# Patient Record
Sex: MALE | Race: BLACK OR AFRICAN AMERICAN | Employment: STUDENT | URBAN - METROPOLITAN AREA
[De-identification: names, ages, dates, MRNs, and addresses within clinical notes are randomized per-mention and may not be internally consistent; named-entity substitution may affect disease eponyms.]

---

## 2018-07-11 ENCOUNTER — HOSPITAL ENCOUNTER (EMERGENCY)
Age: 53
Discharge: HOME | End: 2018-07-11
Attending: EMERGENCY MEDICINE
Payer: OTHER GOVERNMENT

## 2018-07-11 ENCOUNTER — APPOINTMENT (OUTPATIENT)
Dept: GENERAL RADIOLOGY | Age: 53
End: 2018-07-11
Attending: NURSE PRACTITIONER
Payer: OTHER GOVERNMENT

## 2018-07-11 VITALS
TEMPERATURE: 98 F | DIASTOLIC BLOOD PRESSURE: 82 MMHG | OXYGEN SATURATION: 98 % | HEART RATE: 74 BPM | RESPIRATION RATE: 16 BRPM | SYSTOLIC BLOOD PRESSURE: 140 MMHG

## 2018-07-11 DIAGNOSIS — M25.562 ACUTE PAIN OF LEFT KNEE: ICD-10-CM

## 2018-07-11 DIAGNOSIS — S86.912A KNEE STRAIN, LEFT, INITIAL ENCOUNTER: Primary | ICD-10-CM

## 2018-07-11 PROCEDURE — 99283 EMERGENCY DEPT VISIT LOW MDM: CPT

## 2018-07-11 PROCEDURE — 73564 X-RAY EXAM KNEE 4 OR MORE: CPT

## 2018-07-11 PROCEDURE — 74011250637 HC RX REV CODE- 250/637: Performed by: NURSE PRACTITIONER

## 2018-07-11 RX ORDER — HYDROCODONE BITARTRATE AND ACETAMINOPHEN 5; 325 MG/1; MG/1
1 TABLET ORAL
Status: COMPLETED | OUTPATIENT
Start: 2018-07-11 | End: 2018-07-11

## 2018-07-11 RX ORDER — IBUPROFEN 400 MG/1
800 TABLET ORAL
Status: COMPLETED | OUTPATIENT
Start: 2018-07-11 | End: 2018-07-11

## 2018-07-11 RX ORDER — NAPROXEN SODIUM 550 MG/1
550 TABLET ORAL 2 TIMES DAILY WITH MEALS
Qty: 20 TAB | Refills: 0 | Status: SHIPPED | OUTPATIENT
Start: 2018-07-11

## 2018-07-11 RX ADMIN — IBUPROFEN 800 MG: 400 TABLET ORAL at 17:33

## 2018-07-11 RX ADMIN — HYDROCODONE BITARTRATE AND ACETAMINOPHEN 1 TABLET: 5; 325 TABLET ORAL at 17:33

## 2018-07-11 NOTE — ED NOTES
Updated patient on plan of care. Patient expressed understanding. Patient sitting in results waiting room.  NAD

## 2018-07-11 NOTE — LETTER
NOTIFICATION RETURN TO WORK / SCHOOL 
 
7/11/2018 5:39 PM 
 
Mr. Juwan Mae [address] [id number] To Whom It May Concern: 
 
Juwan Mae is currently under the care of [facility] EMERGENCY DEPT. He will return to work/school on: 7/14/2018 If there are questions or concerns please have the patient contact our office. Sincerely, Radha MCCLAINN, RN

## 2018-07-11 NOTE — ED PROVIDER NOTES
HPI Comments: Debbie Yanez is a 47year old male who presents to the ED with a c/o left knee pain for the past five weeks. States he has tried to mediate with OTC meds, but it hasn't helped. Nothing makes it better or worse. Denies trauma. Patient is a 47 y.o. male presenting with knee pain. The history is provided by the patient. History limited by: No communication barrier. Knee Pain    This is a new problem. The problem occurs constantly. The problem has not changed since onset. The pain is present in the left knee. The pain is mild. He has tried nothing for the symptoms. The treatment provided no relief. There has been no history of extremity trauma. Past Medical History:   Diagnosis Date    Anxiety     Constipation     Double vision     Insomnia        Past Surgical History:   Procedure Laterality Date    COLONOSCOPY  3/24/2015 Return 3/25/2025    Dr. Chai Augustin HX BUNIONECTOMY      HX ORTHOPAEDIC           Family History:   Problem Relation Age of Onset    Psychiatric Disorder Mother        Social History     Social History    Marital status: SINGLE     Spouse name: N/A    Number of children: N/A    Years of education: N/A     Occupational History    Not on file. Social History Main Topics    Smoking status: Current Some Day Smoker     Types: Cigarettes    Smokeless tobacco: Never Used    Alcohol use No    Drug use: Yes     Special: Cocaine      Comment: cocaine in the past    Sexual activity: Not Currently     Other Topics Concern    Not on file     Social History Narrative         ALLERGIES: Review of patient's allergies indicates no known allergies. Review of Systems   Constitutional: Negative. HENT: Negative. Eyes: Negative. Respiratory: Negative. Cardiovascular: Negative. Gastrointestinal: Negative. Endocrine: Negative. Genitourinary: Negative. Musculoskeletal: Positive for arthralgias (left knee). Skin: Negative. Allergic/Immunologic: Negative. Neurological: Negative. Hematological: Negative. Psychiatric/Behavioral: Negative. Vitals:    07/11/18 1357   BP: 148/75   Pulse: 74   Resp: 16   Temp: 98 °F (36.7 °C)   SpO2: 98%            Physical Exam   Constitutional: He is oriented to person, place, and time. He appears well-developed and well-nourished. No distress. HENT:   Head: Normocephalic and atraumatic. Eyes: Pupils are equal, round, and reactive to light. Neck: Normal range of motion. Neck supple. Cardiovascular: Normal rate and regular rhythm. Pulmonary/Chest: Effort normal and breath sounds normal.   Abdominal: Soft. Bowel sounds are normal.   Genitourinary:   Genitourinary Comments: NE   Musculoskeletal: Normal range of motion. He exhibits tenderness. He exhibits no edema or deformity. Full ROM in flexion and extension of the left knee. Anterior / Posterior Drawers - neg  Varus / Valgus - neg  Reno's - neg  Distal neurovascular remains intact. Neurological: He is alert and oriented to person, place, and time. Skin: Skin is warm and dry. Psychiatric: He has a normal mood and affect. Nursing note and vitals reviewed. MDM  Number of Diagnoses or Management Options  Acute pain of left knee:   Knee strain, left, initial encounter:   Diagnosis management comments: Progress note:  left KNEE xr REVIEWED. nO ACUTE FINDINGS. Nishant Godinez NP  4:34 PM    MDM:  Will refer to PCP for follow up   Nishant Godinez NP  4:35 PM             Amount and/or Complexity of Data Reviewed  Tests in the radiology section of CPT®: ordered and reviewed  Independent visualization of images, tracings, or specimens: yes (Plain film XR  )    Risk of Complications, Morbidity, and/or Mortality  Presenting problems: low  Diagnostic procedures: low  Management options: low          ED Course       Procedures    Diagnosis:   1. Knee strain, left, initial encounter    2.  Acute pain of left knee Disposition:   discharged to Home. Follow-up Information     Follow up With Details Comments Contact Cyndy Jordan Call to arrange follow up     [place]  [phone]          Patient's Medications   Start Taking    NAPROXEN SODIUM (ANAPROX DS) 550 MG TABLET    Take 1 Tab by mouth two (2) times daily (with meals). Continue Taking    DIVALPROEX ER (DEPAKOTE ER) 500 MG ER TABLET    Take  by mouth. HYDROCODONE-ACETAMINOPHEN (NORCO) 5-325 MG PER TABLET    Take  by mouth. QUETIAPINE (SEROQUEL) 100 MG TABLET    Take 50 mg by mouth two (2) times a day. TRIMETHOPRIM-SULFAMETHOXAZOLE (BACTRIM DS) 160-800 MG PER TABLET    Take 1 Tab by mouth two (2) times a day.    These Medications have changed    No medications on file   Stop Taking    No medications on file

## 2018-07-11 NOTE — DISCHARGE INSTRUCTIONS
[employer] Activation    Thank you for requesting access to [employer]. Please follow the instructions below to securely access and download your online medical record. [employer] allows you to send messages to your doctor, view your test results, renew your prescriptions, schedule appointments, and more. How Do I Sign Up? 1. In your internet browser, go to [email or web address]  2. Click on the First Time User? Click Here link in the Sign In box. You will be redirect to the New Member Sign Up page. 3. Enter your [employer] Access Code exactly as it appears below. You will not need to use this code after youve completed the sign-up process. If you do not sign up before the expiration date, you must request a new code. [employer] Access Code: [email or web address]  Expires: 10/9/2018  1:58 PM (This is the date your [employer] access code will )    4. Enter the last four digits of your Social Security Number (xxxx) and Date of Birth (mm/dd/yyyy) as indicated and click Submit. You will be taken to the next sign-up page. 5. Create a [employer] ID. This will be your [employer] login ID and cannot be changed, so think of one that is secure and easy to remember. 6. Create a [employer] password. You can change your password at any time. 7. Enter your Password Reset Question and Answer. This can be used at a later time if you forget your password. 8. Enter your e-mail address. You will receive e-mail notification when new information is available in [address]. 9. Click Sign Up. You can now view and download portions of your medical record. 10. Click the Download Summary menu link to download a portable copy of your medical information. Additional Information    If you have questions, please visit the Frequently Asked Questions section of the [employer] website at [email or web address] [email or web address]. [email or web address] Remember, [employer] is NOT to be used for urgent needs. For medical emergencies, dial 911.     Follow up with the primary care referral for further evaluation and treatment.

## 2018-07-11 NOTE — ED NOTES
Patient discharged to home. Reviewed discharge information and prescription with patient. All questions answered.  Patient ambulated independently out of care area

## 2023-02-28 ENCOUNTER — NURSE ONLY (OUTPATIENT)
Age: 58
End: 2023-02-28

## 2023-02-28 VITALS
WEIGHT: 202 LBS | OXYGEN SATURATION: 97 % | BODY MASS INDEX: 25.93 KG/M2 | HEIGHT: 74 IN | TEMPERATURE: 97.6 F | RESPIRATION RATE: 17 BRPM | HEART RATE: 74 BPM

## 2023-02-28 DIAGNOSIS — Z12.11 COLON CANCER SCREENING: Primary | ICD-10-CM

## 2023-02-28 NOTE — PROGRESS NOTES
Colon Screen    Patient: Neha Araya MRN: [id number]  SSN: [id number]    YOB: 1965  Age: 65 y.o. Sex: male        Subjective:   Neha Araya was referred by his [address], APRN - NP. Patient referred for colonoscopy for   Screening colonoscopy. Patient denies rectal pain or bleeding. Abdominal surgeries as described below, specifically none. Family history as described below, specifically none. Last colonoscopy was 8 years ago. Allergies   Allergen Reactions    Grass Pollen(K-O-R-T-Swt Wesly)        Past Medical History:   Diagnosis Date    Anxiety     Bipolar 1 disorder ([facility] [address]      Past Surgical History:   Procedure Laterality Date    BUNIONECTOMY      COLONOSCOPY      KNEE ARTHROSCOPY Left     meniscetomy    ORTHOPEDIC SURGERY Right     right radius      No family history on file.   Social History     Tobacco Use    Smoking status: Some Days     Types: Cigarettes    Smokeless tobacco: Never   Substance Use Topics    Alcohol use: Not Currently      Prior to Admission medications    Not on File          Risks colonoscopy described- colon injury, missed lesion, anesthesia problems, bleeding       Annette Krabbe, LPN  February 28, 5533  11:51 AM

## 2023-04-20 ENCOUNTER — HOSPITAL ENCOUNTER (OUTPATIENT)
Age: 58
End: 2023-04-20
Payer: OTHER GOVERNMENT

## 2023-04-20 ENCOUNTER — OFFICE VISIT (OUTPATIENT)
Age: 58
End: 2023-04-20
Payer: OTHER GOVERNMENT

## 2023-04-20 ENCOUNTER — HOSPITAL ENCOUNTER (OUTPATIENT)
Age: 58
Discharge: HOME | End: 2023-04-20
Payer: OTHER GOVERNMENT

## 2023-04-20 DIAGNOSIS — S83.221A PERIPHERAL TEAR OF MEDIAL MENISCUS OF RIGHT KNEE AS CURRENT INJURY, INITIAL ENCOUNTER: ICD-10-CM

## 2023-04-20 DIAGNOSIS — M25.561 ACUTE PAIN OF RIGHT KNEE: Primary | ICD-10-CM

## 2023-04-20 LAB
ANION GAP SERPL CALC-SCNC: 6 MMOL/L (ref 3–18)
BUN SERPL-MCNC: 19 MG/DL (ref 7–18)
BUN/CREAT SERPL: 13 (ref 12–20)
CA-I BLD-MCNC: 9.2 MG/DL (ref 8.5–10.1)
CHLORIDE SERPL-SCNC: 108 MMOL/L (ref 100–111)
CO2 SERPL-SCNC: 27 MMOL/L (ref 21–32)
CREAT SERPL-MCNC: 1.45 MG/DL (ref 0.6–1.3)
EKG ATRIAL RATE: 77 BPM
EKG DIAGNOSIS: NORMAL
EKG P AXIS: 46 DEGREES
EKG P-R INTERVAL: 138 MS
EKG Q-T INTERVAL: 388 MS
EKG QRS DURATION: 90 MS
EKG QTC CALCULATION (BAZETT): 439 MS
EKG R AXIS: -18 DEGREES
EKG T AXIS: 39 DEGREES
EKG VENTRICULAR RATE: 77 BPM
ERYTHROCYTE [DISTWIDTH] IN BLOOD BY AUTOMATED COUNT: 13.2 % (ref 11.6–14.5)
GLUCOSE SERPL-MCNC: 108 MG/DL (ref 74–99)
HCT VFR BLD AUTO: 43.6 % (ref 36–48)
HGB BLD-MCNC: 15 G/DL (ref 13–16)
MCH RBC QN AUTO: 30.9 PG (ref 24–34)
MCHC RBC AUTO-ENTMCNC: 34.4 G/DL (ref 31–37)
MCV RBC AUTO: 89.9 FL (ref 78–100)
NRBC # BLD: 0 K/UL (ref 0–0.01)
NRBC BLD-RTO: 0 PER 100 WBC
PLATELET # BLD AUTO: 259 K/UL (ref 135–420)
PMV BLD AUTO: 9.6 FL (ref 9.2–11.8)
POTASSIUM SERPL-SCNC: 4.5 MMOL/L (ref 3.5–5.5)
RBC # BLD AUTO: 4.85 M/UL (ref 4.35–5.65)
SODIUM SERPL-SCNC: 141 MMOL/L (ref 136–145)
WBC # BLD AUTO: 10.5 K/UL (ref 4.6–13.2)

## 2023-04-20 PROCEDURE — 85027 COMPLETE CBC AUTOMATED: CPT

## 2023-04-20 PROCEDURE — 93005 ELECTROCARDIOGRAM TRACING: CPT

## 2023-04-20 PROCEDURE — 80048 BASIC METABOLIC PNL TOTAL CA: CPT

## 2023-04-20 PROCEDURE — 99214 OFFICE O/P EST MOD 30 MIN: CPT | Performed by: ORTHOPAEDIC SURGERY

## 2023-04-20 PROCEDURE — 36415 COLL VENOUS BLD VENIPUNCTURE: CPT

## 2023-04-20 RX ORDER — DOXYCYCLINE HYCLATE 50 MG/1
CAPSULE, GELATIN COATED ORAL
COMMUNITY
Start: 2023-04-11

## 2023-04-20 RX ORDER — MELOXICAM 15 MG/1
15 TABLET ORAL
COMMUNITY
Start: 2022-05-02 | End: 2023-04-20

## 2023-04-20 RX ORDER — CHLORAL HYDRATE 500 MG
CAPSULE ORAL
COMMUNITY
Start: 2023-04-11

## 2023-04-20 RX ORDER — HYDROXYZINE 50 MG/1
TABLET, FILM COATED ORAL
Status: ON HOLD | COMMUNITY
Start: 2023-04-10 | End: 2023-04-24

## 2023-04-20 RX ORDER — HYDROXYZINE PAMOATE 25 MG/1
25 CAPSULE ORAL
COMMUNITY
Start: 2023-03-29

## 2023-04-20 RX ORDER — ONDANSETRON 8 MG/1
8 TABLET, ORALLY DISINTEGRATING ORAL EVERY 8 HOURS PRN
Qty: 30 TABLET | Refills: 0 | Status: SHIPPED | OUTPATIENT
Start: 2023-04-20

## 2023-04-20 RX ORDER — NALOXONE HYDROCHLORIDE 4 MG/.1ML
SPRAY NASAL
COMMUNITY
Start: 2022-08-01 | End: 2023-04-20

## 2023-04-20 RX ORDER — THIAMINE HCL 50 MG
TABLET ORAL
COMMUNITY
Start: 2023-04-11

## 2023-04-20 RX ORDER — LIDOCAINE 50 MG/G
OINTMENT TOPICAL
COMMUNITY
Start: 2022-06-09 | End: 2023-04-20

## 2023-04-20 RX ORDER — OXYCODONE HYDROCHLORIDE AND ACETAMINOPHEN 5; 325 MG/1; MG/1
1 TABLET ORAL
Qty: 30 TABLET | Refills: 0 | Status: SHIPPED | OUTPATIENT
Start: 2023-04-20 | End: 2023-04-28

## 2023-04-20 RX ORDER — B-COMPLEX WITH VITAMIN C
1 TABLET ORAL DAILY
COMMUNITY

## 2023-04-21 ENCOUNTER — ANESTHESIA EVENT (OUTPATIENT)
Age: 58
End: 2023-04-21
Payer: OTHER GOVERNMENT

## 2023-04-21 RX ORDER — SODIUM CHLORIDE 9 MG/ML
INJECTION, SOLUTION INTRAVENOUS PRN
Status: CANCELLED | OUTPATIENT
Start: 2023-04-21

## 2023-04-24 ENCOUNTER — ANESTHESIA (OUTPATIENT)
Age: 58
End: 2023-04-24
Payer: OTHER GOVERNMENT

## 2023-04-24 ENCOUNTER — HOSPITAL ENCOUNTER (OUTPATIENT)
Age: 58
Setting detail: OUTPATIENT SURGERY
Discharge: HOME | End: 2023-04-24
Attending: ORTHOPAEDIC SURGERY | Admitting: ORTHOPAEDIC SURGERY
Payer: OTHER GOVERNMENT

## 2023-04-24 VITALS
OXYGEN SATURATION: 100 % | BODY MASS INDEX: 28.37 KG/M2 | DIASTOLIC BLOOD PRESSURE: 93 MMHG | SYSTOLIC BLOOD PRESSURE: 154 MMHG | WEIGHT: 221 LBS | HEART RATE: 64 BPM | RESPIRATION RATE: 18 BRPM | TEMPERATURE: 97 F

## 2023-04-24 PROCEDURE — 2580000003 HC RX 258: Performed by: NURSE PRACTITIONER

## 2023-04-24 PROCEDURE — 2500000003 HC RX 250 WO HCPCS: Performed by: ORTHOPAEDIC SURGERY

## 2023-04-24 PROCEDURE — 2500000003 HC RX 250 WO HCPCS: Performed by: NURSE ANESTHETIST, CERTIFIED REGISTERED

## 2023-04-24 PROCEDURE — 6360000002 HC RX W HCPCS: Performed by: NURSE PRACTITIONER

## 2023-04-24 PROCEDURE — 3700000001 HC ADD 15 MINUTES (ANESTHESIA): Performed by: ORTHOPAEDIC SURGERY

## 2023-04-24 PROCEDURE — 3600000012 HC SURGERY LEVEL 2 ADDTL 15MIN: Performed by: ORTHOPAEDIC SURGERY

## 2023-04-24 PROCEDURE — 7100000001 HC PACU RECOVERY - ADDTL 15 MIN: Performed by: ORTHOPAEDIC SURGERY

## 2023-04-24 PROCEDURE — 6360000002 HC RX W HCPCS: Performed by: NURSE ANESTHETIST, CERTIFIED REGISTERED

## 2023-04-24 PROCEDURE — 6370000000 HC RX 637 (ALT 250 FOR IP): Performed by: NURSE PRACTITIONER

## 2023-04-24 PROCEDURE — 3600000002 HC SURGERY LEVEL 2 BASE: Performed by: ORTHOPAEDIC SURGERY

## 2023-04-24 PROCEDURE — 7100000010 HC PHASE II RECOVERY - FIRST 15 MIN: Performed by: ORTHOPAEDIC SURGERY

## 2023-04-24 PROCEDURE — 3700000000 HC ANESTHESIA ATTENDED CARE: Performed by: ORTHOPAEDIC SURGERY

## 2023-04-24 PROCEDURE — 7100000011 HC PHASE II RECOVERY - ADDTL 15 MIN: Performed by: ORTHOPAEDIC SURGERY

## 2023-04-24 PROCEDURE — 2709999900 HC NON-CHARGEABLE SUPPLY: Performed by: ORTHOPAEDIC SURGERY

## 2023-04-24 PROCEDURE — 7100000000 HC PACU RECOVERY - FIRST 15 MIN: Performed by: ORTHOPAEDIC SURGERY

## 2023-04-24 RX ORDER — ONDANSETRON 2 MG/ML
INJECTION INTRAMUSCULAR; INTRAVENOUS PRN
Status: DISCONTINUED | OUTPATIENT
Start: 2023-04-24 | End: 2023-04-24 | Stop reason: SDUPTHER

## 2023-04-24 RX ORDER — QUETIAPINE 150 MG/1
150 TABLET, FILM COATED, EXTENDED RELEASE ORAL DAILY
COMMUNITY

## 2023-04-24 RX ORDER — ALBUTEROL SULFATE 2.5 MG/3ML
2.5 SOLUTION RESPIRATORY (INHALATION)
Status: DISCONTINUED | OUTPATIENT
Start: 2023-04-24 | End: 2023-04-24 | Stop reason: HOSPADM

## 2023-04-24 RX ORDER — MIDAZOLAM HYDROCHLORIDE 1 MG/ML
2 INJECTION INTRAMUSCULAR; INTRAVENOUS
Status: DISCONTINUED | OUTPATIENT
Start: 2023-04-24 | End: 2023-04-24 | Stop reason: HOSPADM

## 2023-04-24 RX ORDER — FENTANYL CITRATE 50 UG/ML
50 INJECTION, SOLUTION INTRAMUSCULAR; INTRAVENOUS EVERY 5 MIN PRN
Status: DISCONTINUED | OUTPATIENT
Start: 2023-04-24 | End: 2023-04-24 | Stop reason: HOSPADM

## 2023-04-24 RX ORDER — DIPHENHYDRAMINE HYDROCHLORIDE 50 MG/ML
12.5 INJECTION INTRAMUSCULAR; INTRAVENOUS
Status: DISCONTINUED | OUTPATIENT
Start: 2023-04-24 | End: 2023-04-24 | Stop reason: HOSPADM

## 2023-04-24 RX ORDER — SODIUM CHLORIDE 0.9 % (FLUSH) 0.9 %
5-40 SYRINGE (ML) INJECTION PRN
Status: DISCONTINUED | OUTPATIENT
Start: 2023-04-24 | End: 2023-04-24 | Stop reason: HOSPADM

## 2023-04-24 RX ORDER — SODIUM CHLORIDE 0.9 % (FLUSH) 0.9 %
5-40 SYRINGE (ML) INJECTION EVERY 12 HOURS SCHEDULED
Status: DISCONTINUED | OUTPATIENT
Start: 2023-04-24 | End: 2023-04-24 | Stop reason: HOSPADM

## 2023-04-24 RX ORDER — OXYCODONE HYDROCHLORIDE AND ACETAMINOPHEN 5; 325 MG/1; MG/1
1 TABLET ORAL EVERY 4 HOURS PRN
Status: CANCELLED | OUTPATIENT
Start: 2023-04-24

## 2023-04-24 RX ORDER — LIDOCAINE HYDROCHLORIDE 20 MG/ML
INJECTION, SOLUTION EPIDURAL; INFILTRATION; INTRACAUDAL; PERINEURAL PRN
Status: DISCONTINUED | OUTPATIENT
Start: 2023-04-24 | End: 2023-04-24 | Stop reason: SDUPTHER

## 2023-04-24 RX ORDER — SODIUM CHLORIDE, SODIUM LACTATE, POTASSIUM CHLORIDE, CALCIUM CHLORIDE 600; 310; 30; 20 MG/100ML; MG/100ML; MG/100ML; MG/100ML
INJECTION, SOLUTION INTRAVENOUS CONTINUOUS
Status: DISCONTINUED | OUTPATIENT
Start: 2023-04-24 | End: 2023-04-24 | Stop reason: HOSPADM

## 2023-04-24 RX ORDER — PROCHLORPERAZINE EDISYLATE 5 MG/ML
5 INJECTION INTRAMUSCULAR; INTRAVENOUS
Status: DISCONTINUED | OUTPATIENT
Start: 2023-04-24 | End: 2023-04-24 | Stop reason: HOSPADM

## 2023-04-24 RX ORDER — CELECOXIB 200 MG/1
200 CAPSULE ORAL ONCE
Status: COMPLETED | OUTPATIENT
Start: 2023-04-24 | End: 2023-04-24

## 2023-04-24 RX ORDER — LABETALOL HYDROCHLORIDE 5 MG/ML
10 INJECTION, SOLUTION INTRAVENOUS
Status: DISCONTINUED | OUTPATIENT
Start: 2023-04-24 | End: 2023-04-24 | Stop reason: HOSPADM

## 2023-04-24 RX ORDER — GLYCOPYRROLATE 0.2 MG/ML
INJECTION INTRAMUSCULAR; INTRAVENOUS PRN
Status: DISCONTINUED | OUTPATIENT
Start: 2023-04-24 | End: 2023-04-24 | Stop reason: SDUPTHER

## 2023-04-24 RX ORDER — BUPIVACAINE HYDROCHLORIDE AND EPINEPHRINE 2.5; 5 MG/ML; UG/ML
INJECTION, SOLUTION INFILTRATION; PERINEURAL PRN
Status: DISCONTINUED | OUTPATIENT
Start: 2023-04-24 | End: 2023-04-24 | Stop reason: HOSPADM

## 2023-04-24 RX ORDER — HALOPERIDOL 5 MG/ML
1 INJECTION INTRAMUSCULAR
Status: DISCONTINUED | OUTPATIENT
Start: 2023-04-24 | End: 2023-04-24 | Stop reason: HOSPADM

## 2023-04-24 RX ORDER — DEXAMETHASONE SODIUM PHOSPHATE 10 MG/ML
INJECTION INTRAMUSCULAR; INTRAVENOUS PRN
Status: DISCONTINUED | OUTPATIENT
Start: 2023-04-24 | End: 2023-04-24 | Stop reason: SDUPTHER

## 2023-04-24 RX ORDER — MIDAZOLAM HYDROCHLORIDE 1 MG/ML
INJECTION INTRAMUSCULAR; INTRAVENOUS PRN
Status: DISCONTINUED | OUTPATIENT
Start: 2023-04-24 | End: 2023-04-24 | Stop reason: SDUPTHER

## 2023-04-24 RX ORDER — PROPOFOL 10 MG/ML
INJECTION, EMULSION INTRAVENOUS PRN
Status: DISCONTINUED | OUTPATIENT
Start: 2023-04-24 | End: 2023-04-24 | Stop reason: SDUPTHER

## 2023-04-24 RX ORDER — LAMOTRIGINE 25 MG/1
25 TABLET ORAL DAILY
COMMUNITY

## 2023-04-24 RX ORDER — OXYCODONE AND ACETAMINOPHEN 10; 325 MG/1; MG/1
1 TABLET ORAL EVERY 6 HOURS PRN
Status: CANCELLED | OUTPATIENT
Start: 2023-04-24

## 2023-04-24 RX ADMIN — GLYCOPYRROLATE 0.2 MG: 0.2 INJECTION INTRAMUSCULAR; INTRAVENOUS at 07:15

## 2023-04-24 RX ADMIN — CEFAZOLIN 2000 MG: 2 INJECTION, POWDER, FOR SOLUTION INTRAMUSCULAR; INTRAVENOUS at 07:38

## 2023-04-24 RX ADMIN — LIDOCAINE HYDROCHLORIDE 100 MG: 20 INJECTION, SOLUTION EPIDURAL; INFILTRATION; INTRACAUDAL; PERINEURAL at 07:35

## 2023-04-24 RX ADMIN — SODIUM CHLORIDE, POTASSIUM CHLORIDE, SODIUM LACTATE AND CALCIUM CHLORIDE: 600; 310; 30; 20 INJECTION, SOLUTION INTRAVENOUS at 07:08

## 2023-04-24 RX ADMIN — HYDROMORPHONE HYDROCHLORIDE 1 MG: 1 INJECTION, SOLUTION INTRAMUSCULAR; INTRAVENOUS; SUBCUTANEOUS at 07:29

## 2023-04-24 RX ADMIN — CELECOXIB 200 MG: 200 CAPSULE ORAL at 07:15

## 2023-04-24 RX ADMIN — MIDAZOLAM HYDROCHLORIDE 2 MG: 2 INJECTION, SOLUTION INTRAMUSCULAR; INTRAVENOUS at 07:29

## 2023-04-24 RX ADMIN — DEXAMETHASONE SODIUM PHOSPHATE 10 MG: 10 INJECTION INTRAMUSCULAR; INTRAVENOUS at 07:39

## 2023-04-24 RX ADMIN — PROPOFOL 200 MG: 10 INJECTION, EMULSION INTRAVENOUS at 07:35

## 2023-04-24 RX ADMIN — ONDANSETRON 4 MG: 2 INJECTION INTRAMUSCULAR; INTRAVENOUS at 07:54

## 2023-04-24 ASSESSMENT — PAIN SCALES - GENERAL
PAINLEVEL_OUTOF10: 0

## 2023-04-24 ASSESSMENT — PAIN - FUNCTIONAL ASSESSMENT: PAIN_FUNCTIONAL_ASSESSMENT: NONE - DENIES PAIN

## 2023-04-24 NOTE — OP NOTE
Operative Note    Patient: Gretel Moreno MRN: [id number]  Surgery Date: [unfilled]  [unfilled]          Procedure  Primary Surgeon    RIGHT KNEE ARTHROSCOPY/ MEDIAL MENISECTOMY/SYNOVECTOMY  Daniela Gomez MD    * Panel 2 does not exist *  * Panel 2 does not exist *    * Panel 3 does not exist *  * Panel 3 does not exist *     Surgeon(s) and Role:     * Daniela Gomez MD - Primary    Other OR Staff/Assistants:  Circulator: Desirae Verde RN  Scrub Person First: Julio Caceres    1st Assistant Tasks:  Closing    Pre-operative Diagnosis:  [unfilled]    Post-operative Diagnosis: same as preop diagnosis    Anesthesia Type: General     Findings: Medial meniscus tear. Synovitis. Complications: No    EBL: 10 CC    Specimens: None    Operative procedure: Meniscectomy and major synovectomy      Operative knee was prepped and draped in a sterile fashion after adequate anesthesia was given. Lateral portal and anteromedial portal were made along with outflow portal.  Patellofemoral joint was visualized patient was found to have a significant amount of synovitis of all 3 compartments at the at that point major synovectomy of all 3 compartments was performed. Medial compartment there is a posterior medial meniscus tear at that point a shaver and up biter were used to do a partial medial meniscectomy. ACL was intact     Lateral compartment there was a lateral synovitis at that point synovectomy of the lateral compartment is performed. There is no evidence of any meniscus tear or chondral injury. Copious irrigation was performed port portals were closed with Steri-Strips compressive dressing was applied patient was taken to PACU in stable condition after extubation.     Daniela Gomez MD

## 2023-04-24 NOTE — DISCHARGE INSTRUCTIONS
Lower Extremity Post-op Instructions: Your first meal home should be clear liquids    If you are given antibiotics, start antibiotics evening of the surgery unless otherwise instructed. Start Pain meds, the night you have surgery if you need is. No alcohol while on pain meds postop. An Ice bag should be applied to your operative site for at least 20 minutes four times a day. DO NOT USE HEAT-this may cause increased swelling and discomfort. You may move your toes as tolerated. You may bear weight as tolerated unless physical therapy has instructed you otherwise with the weightbearing status. Dressing should remain in place for 5 days. If you have a brace on or a splint on than those dressings and splint needs to remain in place until the follow-up appointment. No driving if you have a splint or a brace on. Swelling and discoloration may be present post-operatively. This is normal.    If your job requires little physical activity you may return as you feel able. If your job requires excessive lifting or use of affected extremity, please discuss return to work date with your nurse or physician. If you are given a virtual appointment please make sure you have a smart phone with the camera. If you need refill of pain medication, call the office 2 business days prior to running out of medication. Please call during regular business hours, Medication refill requests will not be addressed during non-business hours. Please do not page the on-call provider for pain medication refills after hours. If you have had an arthroscopic procedure you will be provided with with pictures. Please bring those pictures at the follow-up appointment. If you have a virtual appointment please have the pictures readily available during your virtual appointment.               Things to watch for:             Increased swelling of the surgical site             Spreading of redness around the incision site

## 2023-04-24 NOTE — INTERVAL H&P NOTE
Update History & Physical    The Patient's History and Physical was reviewed with the patient. The patient was examined. There was no change. The surgical site was confirmed by the patient and me. Patient understands and wants to proceed with the procedure. If applicable, I have discussed with the patient / power of  the rationale for blood component transfusion; its benefits in treating or preventing fatigue, organ damage, or death; and its risk which includes mild transfusion reactions, rare risk of blood borne infection, or more serious but rare reactions. I have discussed the alternatives to transfusion, including the risk and consequences of not receiving transfusion. The patient / Ashwini Heady of  had an opportunity to ask questions and had agreed to proceed with transfusion of blood components. Plan:  The risk, benefits, expected outcome, and alternative to the recommended procedure have been discussed with the patient.

## 2023-04-24 NOTE — PROGRESS NOTES
conducted a pre-surgery visit with Carina Estraad, who is a 65 y.o.,male. The  provided the following Interventions:  Initiated a relationship of care and support. Plan:  Chaplains will continue to follow and will provide pastoral care on an as needed/requested basis.  recommends bedside caregivers page  on duty if patient shows signs of acute spiritual or emotional distress.     [address] [place] [address]  [phone]

## 2023-04-24 NOTE — ANESTHESIA PRE PROCEDURE
Department of Anesthesiology  Preprocedure Note       Name:  Dee Raymundo   Age:  65 y.o.  :  1965                                          MRN:  [id number]         Date:  2023      Surgeon: Carleen Levy):  Girish Wyatt MD    Procedure: Procedure(s):  RIGHT KNEE ARTHROSCOPY/ MENISECTOMY    Medications prior to admission:   Prior to Admission medications    Medication Sig Start Date End Date Taking? Authorizing Provider   B Complex Vitamins (VITAMIN B COMPLEX) TABS Take 1 tablet by mouth daily   Yes Historical Provider, MD   vitamin D (CHOLECALCIFEROL) 95621 UNIT CAPS 1,250 mcg 22   Historical Provider, MD   hydrOXYzine HCl (ATARAX) 50 MG tablet  4/10/23   Historical Provider, MD   ferrous gluconate (FERGON) 324 (38 Fe) MG tablet  23   Historical Provider, MD   diclofenac sodium (VOLTAREN) 1 % GEL APPLY FOUR GRAMS TO INTACT SKIN OF PAINFUL AFFECTED AREA FOUR TIMES A DAY AS NEEDED FOR JOINT PAIN *PLEASE USE DICLOFENAC DOSING CARD FOR ADMINISTRATION* 22   Historical Provider, MD   hydrOXYzine pamoate (VISTARIL) 25 MG capsule 1 capsule 3/29/23   Historical Provider, MD   [place]-3 1000 MG CAPS  23   Historical Provider, MD   Thiamine HCl (VITAMIN B-1) 50 MG tablet  23   Historical Provider, MD   oxyCODONE-acetaminophen (PERCOCET) 5-325 MG per tablet Take 1 tablet by mouth every 4-6 hours as needed for Pain for up to 8 days. Do not start taking pain medication until after surgery! Max Daily Amount: 6 tablets 23  LAY Spears   ondansetron (ZOFRAN-ODT) 8 MG TBDP disintegrating tablet Place 1 tablet under the tongue every 8 hours as needed for Nausea or Vomiting 23   LAY Spears       Current medications:    No current facility-administered medications for this encounter. Allergies:     Allergies   Allergen Reactions    Grass Extracts [Gramineae Pollens]      Other reaction(s): unknown    Grass Pollen(K-O-R-T-Swt Wesly)        Problem List:  There is no

## 2023-04-24 NOTE — ANESTHESIA POSTPROCEDURE EVALUATION
Department of Anesthesiology  Postprocedure Note    Patient: Moose Eddy  MRN: [id number]  YOB: 1965  Date of evaluation: 4/24/2023      Procedure Summary     Date: 04/24/23 Room / Location: [facility] MAIN 03 / [facility] MAIN OR    Anesthesia Start: 0729 Anesthesia Stop: 0810    Procedure: RIGHT KNEE ARTHROSCOPY/ MEDIAL MENISECTOMY/SYNOVECTOMY (Right: Knee) Diagnosis:       Traumatic arthropathy of right knee      (Right knee pain, unspecified chronicity [M25.561])    Surgeons: Syed Mulligan MD Responsible Provider: Woody Lefort, APRN - CRNA    Anesthesia Type: General ASA Status: 2          Anesthesia Type: General    Margo Phase I:      Margo Phase II:        Anesthesia Post Evaluation    Patient location during evaluation: PACU  Patient participation: complete - patient participated  Level of consciousness: awake  Airway patency: patent  Nausea & Vomiting: no nausea and no vomiting  Complications: no  Cardiovascular status: hemodynamically stable and blood pressure returned to baseline  Respiratory status: acceptable and nasal cannula  Hydration status: stable  Comments: Ok to discharge once criteria met  Multimodal analgesia pain management approach

## 2023-05-08 ENCOUNTER — TELEMEDICINE (OUTPATIENT)
Age: 58
End: 2023-05-08

## 2023-05-08 DIAGNOSIS — S83.281D ACUTE LATERAL MENISCUS TEAR OF RIGHT KNEE, SUBSEQUENT ENCOUNTER: ICD-10-CM

## 2023-05-08 DIAGNOSIS — Z98.890 S/P ARTHROSCOPIC KNEE SURGERY: Primary | ICD-10-CM

## 2023-05-08 PROCEDURE — 99024 POSTOP FOLLOW-UP VISIT: CPT | Performed by: NURSE PRACTITIONER

## 2023-05-08 NOTE — PROGRESS NOTES
Subjective:      Patient presents for postop care following right knee arthroscopy with partial medial meniscectomy. Surgery was on 4/24/2023. Pain is controlled with current analgesics. Medication(s) being used: naproxen (OTC). .    Objective: There were no vitals taken for this visit. General:  alert, appears stated age, and cooperative   ROM: 0/120   Incisions:   healing well, dry & intact, mild swelling     Assessment:     Doing well postoperatively. Plan:     PT referral.  Pt is to increase activities as tolerated. Follow up joey. Lore Davis

## 2023-05-26 ENCOUNTER — HOSPITAL ENCOUNTER (OUTPATIENT)
Facility: HOSPITAL | Age: 58
Setting detail: RECURRING SERIES
Discharge: HOME | End: 2023-05-29
Payer: OTHER GOVERNMENT

## 2023-05-26 PROCEDURE — 97535 SELF CARE MNGMENT TRAINING: CPT

## 2023-05-26 PROCEDURE — 97162 PT EVAL MOD COMPLEX 30 MIN: CPT

## 2023-05-26 PROCEDURE — 97016 VASOPNEUMATIC DEVICE THERAPY: CPT

## 2023-05-31 ENCOUNTER — HOSPITAL ENCOUNTER (OUTPATIENT)
Facility: HOSPITAL | Age: 58
Setting detail: RECURRING SERIES
Discharge: HOME | End: 2023-06-03
Payer: OTHER GOVERNMENT

## 2023-05-31 PROCEDURE — 97016 VASOPNEUMATIC DEVICE THERAPY: CPT

## 2023-05-31 PROCEDURE — 97112 NEUROMUSCULAR REEDUCATION: CPT

## 2023-05-31 PROCEDURE — 97110 THERAPEUTIC EXERCISES: CPT

## 2023-05-31 NOTE — PROGRESS NOTES
PHYSICAL / OCCUPATIONAL THERAPY - DAILY TREATMENT NOTE (updated )    Patient Name: Frederick Runner    Date: 2023    : 1965  Insurance: Payor: Kyung  / Plan: Kyung  / Product Type: *No Product type* /      Patient  verified Yes     Visit #   Current / Total 2 15   Time   In / Out 12:34 1:35   Pain   In / Out 3 0   Subjective Functional Status/Changes: \"I was good about resting this weekend, so the pain is better. \"   Changes to:  Meds, Allergies, Med Hx, Sx Hx? If yes, update Summary List yes       TREATMENT AREA =  Pain in right knee [M25.561]  Other specified postprocedural states [Z98.890]  Other tear of lateral meniscus, current injury, right knee, subsequent encounter [E35.306G]    OBJECTIVE    Modalities Rationale:     decrease edema, decrease inflammation, and decrease pain to improve patient's ability to progress to PLOF and address remaining functional goals. min [] Estim Unattended, type/location:                                      []  w/ice    []  w/heat    min [] Estim Attended, type/location:                                     []  w/US     []  w/ice    []  w/heat    []  TENS insruct      min []  Mechanical Traction: type/lbs                   []  pro   []  sup   []  int   []  cont    []  before manual    []  after manual    min []  Ultrasound, settings/location:      min []  Iontophoresis w/ dexamethasone, location:                                               []  take home patch       []  in clinic    min  unbill []  Ice     []  Heat    location/position:     min []  Paraffin,  details:    10 min [x]  Vasopneumatic Device, press/temp: Low 34 deg; right knee    min []  Raji Cool / Tessy Ode:     If using vaso (only need to measure limb vaso being performed on)      pre-treatment girth : 40.5 cm      post-treatment girth : 40.2 cm      measured at (landmark location) :  mid patella    min []  Other:    Skin assessment post-treatment (if applicable):    [x]  intact

## 2023-06-05 ENCOUNTER — HOSPITAL ENCOUNTER (OUTPATIENT)
Facility: HOSPITAL | Age: 58
Setting detail: RECURRING SERIES
Discharge: HOME | End: 2023-06-08
Payer: OTHER GOVERNMENT

## 2023-06-05 PROCEDURE — 97110 THERAPEUTIC EXERCISES: CPT

## 2023-06-05 PROCEDURE — 97016 VASOPNEUMATIC DEVICE THERAPY: CPT

## 2023-06-05 PROCEDURE — 97112 NEUROMUSCULAR REEDUCATION: CPT

## 2023-06-05 NOTE — PROGRESS NOTES
analyze and address ROM deficits, analyze and address strength deficits, analyze and address soft tissue restrictions, analyze and cue for proper movement patterns, and analyze and modify for postural abnormalities to address functional deficits and attain remaining goals. Progress toward goals / Updated goals:  []  See Progress Note/Recertification    Short Term Goals: To be accomplished in 4 treatments  Patient will report daily compliance with HEP to improve tolerance to ADLs. Status at last assessment: Provided with HEP at   Long Term Goals: To be accomplished in 15 treatments  Patient will report pain 2/10 at worst to improve tolerance to stair negotiation and community ambulation. Status at last assessment: pain 9/10 at worst  Current: pain 8/10 at worst; exacerbation after session on 5/31/23 (6/5/23)  Patient will increase right knee flexion AROM to 125 degrees to improve functional squat. Status at last assessment: right knee flexion AROM 115  Patient will increase bilateral hip extension strength to 4+/5 to improve functional squat. Status at last assessment: bilateral hip extension strength 4-/5  4. Patient will increase FOTO Score to 53 points to improve tolerance to ADLs.    Status at last assessment:  FOTO Score 36 points    PN Due 6/25/23    PLAN  Yes  Continue plan of care  [x]  Upgrade activities as tolerated  []  Discharge due to :  []  Other:    Faviola Lakhanier, PT    6/5/2023    1:17 PM    Future Appointments   Date Time Provider Genesis Tran   6/7/2023 12:20 PM Magdiel Dodson, PT [facility]   6/12/2023 12:20 PM Magdiel Dodson, PT [facility]   6/14/2023 12:20 PM Magdiel Dodson, PT [facility]   6/19/2023  1:00 PM Faviola Sender, PT [facility]   6/21/2023 12:20 PM [facility] Sender, PT [facility]   6/26/2023  1:00 PM Faviola Sender, PT [facility]   6/28/2023 12:20 PM Pricila Cutting, PTA [facility]

## 2023-06-07 ENCOUNTER — HOSPITAL ENCOUNTER (OUTPATIENT)
Facility: HOSPITAL | Age: 58
Setting detail: RECURRING SERIES
Discharge: HOME | End: 2023-06-10
Payer: OTHER GOVERNMENT

## 2023-06-07 PROCEDURE — 97016 VASOPNEUMATIC DEVICE THERAPY: CPT

## 2023-06-07 PROCEDURE — 97110 THERAPEUTIC EXERCISES: CPT

## 2023-06-07 PROCEDURE — 97112 NEUROMUSCULAR REEDUCATION: CPT

## 2023-06-07 NOTE — PROGRESS NOTES
treatments  Patient will report pain 2/10 at worst to improve tolerance to stair negotiation and community ambulation. Status at last assessment: pain 9/10 at worst  Current: pain 8/10 at worst; exacerbation after session on 5/31/23 (6/5/23)  Patient will increase right knee flexion AROM to 125 degrees to improve functional squat. Status at last assessment: right knee flexion AROM 115  Current:   Patient will increase bilateral hip extension strength to 4+/5 to improve functional squat. Status at last assessment: bilateral hip extension strength 4-/5  4. Patient will increase FOTO Score to 53 points to improve tolerance to ADLs.    Status at last assessment:  FOTO Score 36 points    PLAN  [x] Continue plan of care  [x]  Upgrade activities as tolerated  []  Discharge due to :  []  Other:    Ricardo Mcgraw PT    6/7/2023    7:21 AM    Future Appointments   Date Time Provider Genesis Tran   6/7/2023 12:20 PM Ricardo Mcgraw PT [facility]   6/12/2023 12:20 PM Ricardo Mcgraw PT [facility]   6/14/2023 12:20 PM Ricardo Mcgraw, PT [facility]   6/19/2023  1:00 PM Lion Dozier, PT [facility]   6/21/2023  4:00 PM Shen , PTA [facility]   6/26/2023  1:00 PM Lionata Dozier, PT [facility]   6/28/2023 12:20 PM Shen , PTA [facility]

## 2023-06-19 ENCOUNTER — HOSPITAL ENCOUNTER (OUTPATIENT)
Facility: HOSPITAL | Age: 58
Setting detail: RECURRING SERIES
Discharge: HOME | End: 2023-06-22
Payer: OTHER GOVERNMENT

## 2023-06-19 PROCEDURE — 97112 NEUROMUSCULAR REEDUCATION: CPT

## 2023-06-19 PROCEDURE — 97110 THERAPEUTIC EXERCISES: CPT

## 2023-06-19 NOTE — PROGRESS NOTES
PHYSICAL / OCCUPATIONAL THERAPY - DAILY TREATMENT NOTE (updated )    Patient Name: Sven Evans    Date: 2023    : 1965  Insurance: Payor: Jazmyne Marco / Plan: Jazmyne Marco / Product Type: *No Product type* /      Patient  verified Yes     Visit #   Current / Total 6 15   Time   In / Out 1:02 1:50   Pain   In / Out 4 0   Subjective Functional Status/Changes: \"I'm feeling frustrated. It's taking so long to heal.\"   Changes to:  Meds, Allergies, Med Hx, Sx Hx? If yes, update Summary List yes       TREATMENT AREA =  Pain in right knee [M25.561]  Other specified postprocedural states [Z98.890]  Other tear of lateral meniscus, current injury, right knee, subsequent encounter [P51.223D]    OBJECTIVE    Modalities Rationale:     decrease edema, decrease inflammation, and decrease pain to improve patient's ability to progress to PLOF and address remaining functional goals. min [] Estim Unattended, type/location:                                      []  w/ice    []  w/heat    min [] Estim Attended, type/location:                                     []  w/US     []  w/ice    []  w/heat    []  TENS insruct      min []  Mechanical Traction: type/lbs                   []  pro   []  sup   []  int   []  cont    []  before manual    []  after manual    min []  Ultrasound, settings/location:      min []  Iontophoresis w/ dexamethasone, location:                                               []  take home patch       []  in clinic    min  unbill []  Ice     []  Heat    location/position:     min []  Paraffin,  details:    10 min [x]  Vasopneumatic Device, press/temp: Low 34 deg; right knee    min []  Romeo Frost / Juanita Friend:     If using vaso (only need to measure limb vaso being performed on)      pre-treatment girth : 40.5 cm      post-treatment girth : 40.3 cm      measured at (landmark location) :  mid patella    min []  Other:    Skin assessment post-treatment (if applicable):    [x]  intact    []

## 2023-06-21 ENCOUNTER — HOSPITAL ENCOUNTER (OUTPATIENT)
Facility: HOSPITAL | Age: 58
Setting detail: RECURRING SERIES
Discharge: HOME | End: 2023-06-24
Payer: OTHER GOVERNMENT

## 2023-06-21 PROCEDURE — 97110 THERAPEUTIC EXERCISES: CPT

## 2023-06-21 PROCEDURE — G0283 ELEC STIM OTHER THAN WOUND: HCPCS

## 2023-06-21 PROCEDURE — 97112 NEUROMUSCULAR REEDUCATION: CPT

## 2023-06-21 NOTE — PROGRESS NOTES
PHYSICAL / OCCUPATIONAL THERAPY - DAILY TREATMENT NOTE (updated )    Patient Name: Eldonna Burkitt    Date: 2023    : 1965  Insurance: Payor: Daryle Moors / Plan: Daryle Moors / Product Type: *No Product type* /      Patient  verified Yes     Visit #   Current / Total 7 15   Time   In / Out 4:00 pm 5:06 pm   Pain   In / Out soreness/10 0/10   Subjective Functional Status/Changes: Patient reports difficulty and pain when attempting to straighten his knee fully as he is walking. Changes to:  Meds, Allergies, Med Hx, Sx Hx? If yes, update Summary List yes       TREATMENT AREA =  Pain in right knee [M25.561]  Other specified postprocedural states [Z98.890]  Other tear of lateral meniscus, current injury, right knee, subsequent encounter [S83.099D]    OBJECTIVE    Modalities Rationale:     decrease edema, decrease inflammation, and decrease pain to improve patient's ability to progress to PLOF and address remaining functional goals. 10 min [x] Estim Unattended, type/location: IFC to (R) knee                                     [x]  w/ice (anterior)   [x]  w/heat (posterior)    min [] Estim Attended, type/location:                                     []  w/US     []  w/ice    []  w/heat    []  TENS insruct      min []  Mechanical Traction: type/lbs                   []  pro   []  sup   []  int   []  cont    []  before manual    []  after manual    min []  Ultrasound, settings/location:      min []  Iontophoresis w/ dexamethasone, location:                                               []  take home patch       []  in clinic    min  unbill []  Ice     []  Heat    location/position:     min []  Paraffin,  details:     min []  Vasopneumatic Device, press/temp:     min []  Opal Bella / Jeanie Pittman:     If using vaso (only need to measure limb vaso being performed on)      pre-treatment girth :       post-treatment girth :       measured at (landmark location) :      min []  Other:    Skin assessment

## 2023-06-26 ENCOUNTER — HOSPITAL ENCOUNTER (OUTPATIENT)
Facility: HOSPITAL | Age: 58
Setting detail: RECURRING SERIES
Discharge: HOME | End: 2023-06-29
Payer: OTHER GOVERNMENT

## 2023-06-26 PROCEDURE — 97016 VASOPNEUMATIC DEVICE THERAPY: CPT

## 2023-06-26 PROCEDURE — 97112 NEUROMUSCULAR REEDUCATION: CPT

## 2023-06-26 PROCEDURE — 97110 THERAPEUTIC EXERCISES: CPT

## 2023-06-28 ENCOUNTER — HOSPITAL ENCOUNTER (OUTPATIENT)
Facility: HOSPITAL | Age: 58
Setting detail: RECURRING SERIES
Discharge: HOME | End: 2023-07-01
Payer: OTHER GOVERNMENT

## 2023-06-28 PROCEDURE — 97110 THERAPEUTIC EXERCISES: CPT

## 2023-06-28 PROCEDURE — 97112 NEUROMUSCULAR REEDUCATION: CPT

## 2023-06-28 PROCEDURE — 97016 VASOPNEUMATIC DEVICE THERAPY: CPT

## 2023-07-03 ENCOUNTER — TELEMEDICINE (OUTPATIENT)
Age: 58
End: 2023-07-03

## 2023-07-03 DIAGNOSIS — Z98.890 S/P ARTHROSCOPIC KNEE SURGERY: Primary | ICD-10-CM

## 2023-07-03 PROCEDURE — 99024 POSTOP FOLLOW-UP VISIT: CPT | Performed by: NURSE PRACTITIONER

## 2023-07-03 NOTE — PROGRESS NOTES
Subjective:      Patient presents for postop care following right knee arthroscopy with partial medial meniscectomy. Surgery was on 4/24/2023. Just finished a course of PT but with continued pain on weight-bearing. .    Objective: There were no vitals taken for this visit. General:  alert, appears stated age, and cooperative   ROM: 0/120     Assessment:     Doing well postoperatively. Continues with mild OA pain in the right knee    Plan:     Continue PT home exercises. Consider follow up in clinic for a corticosteroid injection.

## 2023-07-27 ENCOUNTER — HOSPITAL ENCOUNTER (OUTPATIENT)
Facility: HOSPITAL | Age: 58
Setting detail: RECURRING SERIES
Discharge: HOME | End: 2023-07-30
Payer: OTHER GOVERNMENT

## 2023-07-27 PROCEDURE — 97110 THERAPEUTIC EXERCISES: CPT

## 2023-07-27 PROCEDURE — 97530 THERAPEUTIC ACTIVITIES: CPT

## 2023-07-27 PROCEDURE — 97016 VASOPNEUMATIC DEVICE THERAPY: CPT

## 2023-07-27 PROCEDURE — 97112 NEUROMUSCULAR REEDUCATION: CPT

## 2023-07-27 NOTE — PROGRESS NOTES
[address] [facility] [facility] AT [address] [facility]. [place], [address]  Phone: [phone] Fax: [phone]  PROGRESS NOTE  Patient Name: Bessy Wills : 1965   Treatment/Medical Diagnosis: Pain in right knee [M25.561]  Other specified postprocedural states [Z98.890]  Other tear of lateral meniscus, current injury, right knee, subsequent encounter [S83.281D]   Referral Source:  Payor LAY Scott  Payor: Tasneem Rizo / Plan: Tasneem Sallie / Product Type: *No Product type* /      Date of Initial Visit: 23 Attended Visits: 9 Missed Visits: 2     SUMMARY OF TREATMENT  Bessy Wills is a 65 y.o. male with Dx of Pain in right knee [M25.561], Other specified postprocedural states [Z98.890], and Other tear of lateral meniscus, current injury, right knee, subsequent encounter [S83.281D]. Treatment has consisted of  therapeutic exercise, therapeutic activity, neuromuscular re-education, gait training and self care education and physical agent/modality to improve right knee pain and function. CURRENT STATUS    Subjective:    Last PN:  Reported Improvement: 30%  Pain:    Average 6/10              Best 0/10              Worst 7/10  Reported Functional Deficits: walking, sit to stand, stairs  Reported Functional Improvements: supine with feet elevated    Today:   Reported Improvement: 60%  Pain:    Average 6/10              Best 0/10              Worst 9/10  Reported Functional Deficits: Pt reports he struggles with weightbearing activities such as walking, standing, transfers. Pt also notes difficulty with knee flexion. Reported Functional Improvements: Pt notes his gait and balance have improved since beginning therapy. Pt also states he has increased flexibility.     Objective:  Last PN:  FOTO score: 33/100 (at last assessment, 36/100)  ROM: (R) knee AROM: 3-122 deg  Strength: (R) knee strength: ext <3/5 due to lack of full AROM, flex 4+/5, bilateral

## 2023-07-27 NOTE — PROGRESS NOTES
PHYSICAL / OCCUPATIONAL THERAPY - DAILY TREATMENT NOTE (updated )    Patient Name: Robert Matos    Date: 2023    : 1965  Insurance: Payor: Memory Poet / Plan: Memory [employer] / Product Type: *No Product type* /      Patient  verified Yes     Visit #   Current / Total 2 10   Time   In / Out 9:00 9:49   Pain   In / Out 0 5   Subjective Functional Status/Changes: See PN. Changes to:  Meds, Allergies, Med Hx, Sx Hx? If yes, update Summary List yes       TREATMENT AREA =  Pain in right knee [M25.561]  Other specified postprocedural states [Z98.890]  Other tear of lateral meniscus, current injury, right knee, subsequent encounter [H51.683I]    OBJECTIVE    Modalities Rationale:     decrease edema, decrease inflammation, and decrease pain to improve patient's ability to progress to PLOF and address remaining functional goals. min [x] Estim Unattended, type/location:                                     [x]  w/ice (anterior)   [x]  w/heat (posterior)    min [] Estim Attended, type/location:                                     []  w/US     []  w/ice    []  w/heat    []  TENS insruct      min []  Mechanical Traction: type/lbs                   []  pro   []  sup   []  int   []  cont    []  before manual    []  after manual    min []  Ultrasound, settings/location:      min []  Iontophoresis w/ dexamethasone, location:                                               []  take home patch       []  in clinic    min  unbill []  Ice     []  Heat    location/position:     min []  Paraffin,  details:    10 min [x]  Vasopneumatic Device, press/temp: Med/Low    min []  Christina Belt / Rob Lefort:     If using vaso (only need to measure limb vaso being performed on)      pre-treatment girth :      post-treatment girth : pt notes \"relief\" following vaso, vaso performed for pain relief      measured at (landmark location) :     min []  Other:    Skin assessment post-treatment (if applicable):    [x]  intact    [] needs device/independent

## 2023-08-03 ENCOUNTER — HOSPITAL ENCOUNTER (OUTPATIENT)
Facility: HOSPITAL | Age: 58
Setting detail: RECURRING SERIES
Discharge: HOME | End: 2023-08-06
Payer: OTHER GOVERNMENT

## 2023-08-03 PROCEDURE — 97112 NEUROMUSCULAR REEDUCATION: CPT

## 2023-08-03 PROCEDURE — 97530 THERAPEUTIC ACTIVITIES: CPT

## 2023-08-03 PROCEDURE — 97110 THERAPEUTIC EXERCISES: CPT

## 2023-08-03 NOTE — PROGRESS NOTES
PHYSICAL / OCCUPATIONAL THERAPY - DAILY TREATMENT NOTE (updated )    Patient Name: Vida Flores    Date: 8/3/2023    : 1965  Insurance: Payor: Scottie Das / Plan: Scottie Das / Product Type: *No Product type* /      Patient  verified yes     Visit #   Current / Total 1 10 Total Time   Time   In / Out 9:00 9:48 48   Pain   In / Out 5 0    Subjective Functional Status/Changes: I am coming along, but still having pain with weight bearing. TREATMENT AREA =  Pain in right knee [M25.561]  Other specified postprocedural states [Z98.890]  Other tear of lateral meniscus, current injury, right knee, subsequent encounter [S83.281D]    OBJECTIVE      Therapeutic Procedures:  50  Total  [facility] Totals Reminder: bill using total billable min of TIMED therapeutic procedures (example: do not include dry needle or estim unattended, both untimed codes, in totals to left)  8-22 min = 1 unit; 23-37 min = 2 units; 38-52 min = 3 units; 53-67 min = 4 units; 68-82 min = 5 units   Tx Min  Procedure, Rationale, Specifics   25  50211 Therapeutic Exercise (timed):  increase ROM, strength, coordination, balance, and proprioception to improve patient's ability to progress to PLOF and address remaining functional goals. (see flow sheet as applicable)   Details if applicable:       12  84320 Therapeutic Activity (timed):  use of dynamic activities replicating functional movements to increase ROM, strength, coordination, balance, and proprioception in order to improve patient's ability to progress to PLOF and address remaining functional goals.   (see flow sheet as applicable)   Details if applicable:       11  02560 Neuromuscular Re-Education (timed):  improve balance, coordination, kinesthetic sense, posture, core stability and proprioception to improve patient's ability to develop conscious control of individual muscles and awareness of position of extremities in order to progress to PLOF and address remaining functional

## 2023-08-10 ENCOUNTER — HOSPITAL ENCOUNTER (OUTPATIENT)
Facility: HOSPITAL | Age: 58
Setting detail: RECURRING SERIES
Discharge: HOME | End: 2023-08-13
Payer: OTHER GOVERNMENT

## 2023-08-10 PROCEDURE — 97530 THERAPEUTIC ACTIVITIES: CPT

## 2023-08-10 PROCEDURE — 97112 NEUROMUSCULAR REEDUCATION: CPT

## 2023-08-10 PROCEDURE — 97110 THERAPEUTIC EXERCISES: CPT

## 2023-08-11 NOTE — PROGRESS NOTES
PHYSICAL / OCCUPATIONAL THERAPY - DAILY TREATMENT NOTE (updated )    Patient Name: Vida Flores    Date: 8/10/2023    : 1965  Insurance: Payor: Scottie Das / Plan: Scottie Das / Product Type: *No Product type* /      Patient  verified yes     Visit #   Current / Total 2 10 Total Time   Time   In / Out 9:40 am 10:21 am 41   Pain   In / Out 5 0    Subjective Functional Status/Changes: \"I need to work on my strength. \"     TREATMENT AREA =  Pain in right knee [M25.561]  Other specified postprocedural states [Z98.890]  Other tear of lateral meniscus, current injury, right knee, subsequent encounter [S83.281D]    OBJECTIVE      Therapeutic Procedures:  39  Total  [facility] Totals Reminder: bill using total billable min of TIMED therapeutic procedures (example: do not include dry needle or estim unattended, both untimed codes, in totals to left)  8-22 min = 1 unit; 23-37 min = 2 units; 38-52 min = 3 units; 53-67 min = 4 units; 68-82 min = 5 units   Tx Min  Procedure, Rationale, Specifics   18  21938 Therapeutic Exercise (timed):  increase ROM, strength, coordination, balance, and proprioception to improve patient's ability to progress to PLOF and address remaining functional goals. (see flow sheet as applicable)   Details if applicable:       12  05112 Therapeutic Activity (timed):  use of dynamic activities replicating functional movements to increase ROM, strength, coordination, balance, and proprioception in order to improve patient's ability to progress to PLOF and address remaining functional goals. (see flow sheet as applicable)   Details if applicable:       11  62253 Neuromuscular Re-Education (timed):  improve balance, coordination, kinesthetic sense, posture, core stability and proprioception to improve patient's ability to develop conscious control of individual muscles and awareness of position of extremities in order to progress to PLOF and address remaining functional goals.  (see flow sheet

## 2023-08-17 ENCOUNTER — HOSPITAL ENCOUNTER (OUTPATIENT)
Facility: HOSPITAL | Age: 58
Setting detail: RECURRING SERIES
Discharge: HOME | End: 2023-08-20
Payer: OTHER GOVERNMENT

## 2023-08-17 PROCEDURE — 97530 THERAPEUTIC ACTIVITIES: CPT

## 2023-08-17 PROCEDURE — 97112 NEUROMUSCULAR REEDUCATION: CPT

## 2023-08-17 PROCEDURE — 97110 THERAPEUTIC EXERCISES: CPT

## 2023-08-17 NOTE — PROGRESS NOTES
(see flow sheet as applicable)     Details if applicable:           [x]  Patient Education billed concurrently with other procedures   [x] Review HEP    [] Progressed/Changed HEP, detail:    [] Other detail:       Objective Information/Functional Measures/Assessment    -Pt reports full quadriceps contraction with retro BOSU lunge today. He continues to report inc pain with BOSU squatting to increased depth. Initiated ball on wall squat to limited depth to achieve quadriceps activation without inc knee flexion to generate pain. Patient will continue to benefit from skilled PT / OT services to modify and progress therapeutic interventions, analyze and address functional mobility deficits, analyze and address ROM deficits, analyze and address strength deficits, analyze and address soft tissue restrictions, analyze and cue for proper movement patterns, analyze and modify for postural abnormalities, analyze and address imbalance/dizziness, and instruct in home and community integration to address functional deficits and attain remaining goals. Progress toward goals / Updated goals:  []  See Progress Note/Recertification    - Patient will report pain 2/10 at worst to improve tolerance to stair negotiation and community ambulation. Last PN:9/10 at worst  Current: goal progressing; 5-6/10 pain average (8/14/23)  - Patient will increase bilateral hip extension strength to 4+/5 to improve functional squat. Last PN: goal progressing; bilateral hip extension strength 4/5  Current: addressing with BOSU lunges (8/3/23)   - Patient will increase FOTO Score to 53 points to improve tolerance to ADLs. Status at last assessment:  FOTO Score 33 PTS    PLAN  [x] Continue plan of care  [x]  Upgrade activities as tolerated  []  Discharge due to :  []  Other:    Governor Lee, PT    8/17/2023    10:03 AM    No future appointments.

## 2023-10-05 NOTE — PROGRESS NOTES
Instructions for your procedure at [facility] Date: 10/5/2023      Patient's Name: Arthur Pollard      Procedure Date: 10/18/2023        Please enter the main entrance of the hospital and check-in at the  located in the lobby. Do NOT eat or drink anything, including candy, gum, or ice chips after midnight prior to your procedure, unless it is part of your prep. Brush your teeth before coming to the hospital.You may swish with water, but do not swallow. No smoking/Vaping/E-Cigarettes 24 hours prior to the day of procedure. No alcohol 24 hours prior to the day of procedure. No recreational drugs for one week prior to the day of procedure. Bring Photo ID, Insurance information, and Co-pay if required on day of procedure. Bring in pertinent legal documents, such as, Medical Power of NEETA SHAYNE, DNR, Advance Directive, etc.  Leave all other valuables, including money/purse, at home. Remove jewelry, including ALL body piercings, nail polish, acrylic nails, and makeup (including mascara); no lotions, powders, deodorant, and/or perfume/cologne/after shave on the skin. Glasses and dentures may be worn to the hospital.  They must be removed prior to procedure. Please bring case/container for glasses or dentures. 11. Contacts should not be worn on day of procedure. 12. Call the office ([phone]) if you have symptoms of a cold or illness within 24-48 hours prior to your procedure. 13. AN ADULT (relative or friend 18 years or older) MUST DRIVE YOU HOME AFTER YOUR PROCEDURE. 14. Please make arrangements for a responsible adult (18 years or older) to be with you for 24 hours after your procedure. 13. ONE VISITOR will be allowed in the waiting area during your procedure. Special Instructions:      Bring list of CURRENT medications. Follow instructions from the office regarding Bowel Prep.       Any questions regarding prep, please call the office

## 2023-10-17 ENCOUNTER — ANESTHESIA EVENT (OUTPATIENT)
Facility: HOSPITAL | Age: 58
End: 2023-10-17
Payer: COMMERCIAL

## 2023-10-18 ENCOUNTER — HOSPITAL ENCOUNTER (OUTPATIENT)
Facility: HOSPITAL | Age: 58
Setting detail: OUTPATIENT SURGERY
Discharge: HOME | End: 2023-10-18
Attending: COLON & RECTAL SURGERY | Admitting: COLON & RECTAL SURGERY
Payer: COMMERCIAL

## 2023-10-18 ENCOUNTER — ANESTHESIA (OUTPATIENT)
Facility: HOSPITAL | Age: 58
End: 2023-10-18
Payer: COMMERCIAL

## 2023-10-18 VITALS
OXYGEN SATURATION: 100 % | TEMPERATURE: 97.7 F | DIASTOLIC BLOOD PRESSURE: 76 MMHG | BODY MASS INDEX: 25.82 KG/M2 | HEIGHT: 74 IN | HEART RATE: 58 BPM | SYSTOLIC BLOOD PRESSURE: 120 MMHG | RESPIRATION RATE: 18 BRPM | WEIGHT: 201.2 LBS

## 2023-10-18 LAB
AMPHET UR QL SCN: NEGATIVE
BARBITURATES UR QL SCN: NEGATIVE
BENZODIAZ UR QL: NEGATIVE
CANNABINOIDS UR QL SCN: NEGATIVE
COCAINE UR QL SCN: NEGATIVE
Lab: NORMAL
OPIATES UR QL: NEGATIVE
PCP UR QL: NEGATIVE

## 2023-10-18 PROCEDURE — 80307 DRUG TEST PRSMV CHEM ANLYZR: CPT

## 2023-10-18 PROCEDURE — 6370000000 HC RX 637 (ALT 250 FOR IP): Performed by: NURSE ANESTHETIST, CERTIFIED REGISTERED

## 2023-10-18 PROCEDURE — 3700000000 HC ANESTHESIA ATTENDED CARE: Performed by: COLON & RECTAL SURGERY

## 2023-10-18 PROCEDURE — 6360000002 HC RX W HCPCS: Performed by: NURSE ANESTHETIST, CERTIFIED REGISTERED

## 2023-10-18 PROCEDURE — 7100000000 HC PACU RECOVERY - FIRST 15 MIN: Performed by: COLON & RECTAL SURGERY

## 2023-10-18 PROCEDURE — 88305 TISSUE EXAM BY PATHOLOGIST: CPT

## 2023-10-18 PROCEDURE — 3600007502: Performed by: COLON & RECTAL SURGERY

## 2023-10-18 PROCEDURE — 2500000003 HC RX 250 WO HCPCS: Performed by: NURSE ANESTHETIST, CERTIFIED REGISTERED

## 2023-10-18 PROCEDURE — 7100000010 HC PHASE II RECOVERY - FIRST 15 MIN: Performed by: COLON & RECTAL SURGERY

## 2023-10-18 PROCEDURE — 2580000003 HC RX 258: Performed by: NURSE ANESTHETIST, CERTIFIED REGISTERED

## 2023-10-18 PROCEDURE — 2709999900 HC NON-CHARGEABLE SUPPLY: Performed by: COLON & RECTAL SURGERY

## 2023-10-18 PROCEDURE — 7100000011 HC PHASE II RECOVERY - ADDTL 15 MIN: Performed by: COLON & RECTAL SURGERY

## 2023-10-18 PROCEDURE — 7100000001 HC PACU RECOVERY - ADDTL 15 MIN: Performed by: COLON & RECTAL SURGERY

## 2023-10-18 PROCEDURE — 3700000001 HC ADD 15 MINUTES (ANESTHESIA): Performed by: COLON & RECTAL SURGERY

## 2023-10-18 PROCEDURE — 3600007512: Performed by: COLON & RECTAL SURGERY

## 2023-10-18 RX ORDER — LIDOCAINE HYDROCHLORIDE 10 MG/ML
1 INJECTION, SOLUTION EPIDURAL; INFILTRATION; INTRACAUDAL; PERINEURAL
Status: DISCONTINUED | OUTPATIENT
Start: 2023-10-18 | End: 2023-10-18 | Stop reason: HOSPADM

## 2023-10-18 RX ORDER — PHENYLEPHRINE HCL IN 0.9% NACL 1 MG/10 ML
SYRINGE (ML) INTRAVENOUS PRN
Status: DISCONTINUED | OUTPATIENT
Start: 2023-10-18 | End: 2023-10-18 | Stop reason: SDUPTHER

## 2023-10-18 RX ORDER — PROPOFOL 10 MG/ML
INJECTION, EMULSION INTRAVENOUS PRN
Status: DISCONTINUED | OUTPATIENT
Start: 2023-10-18 | End: 2023-10-18 | Stop reason: SDUPTHER

## 2023-10-18 RX ORDER — LIDOCAINE HYDROCHLORIDE 20 MG/ML
INJECTION, SOLUTION EPIDURAL; INFILTRATION; INTRACAUDAL; PERINEURAL PRN
Status: DISCONTINUED | OUTPATIENT
Start: 2023-10-18 | End: 2023-10-18 | Stop reason: SDUPTHER

## 2023-10-18 RX ORDER — FAMOTIDINE 20 MG/1
20 TABLET, FILM COATED ORAL ONCE
Status: COMPLETED | OUTPATIENT
Start: 2023-10-18 | End: 2023-10-18

## 2023-10-18 RX ORDER — SODIUM CHLORIDE 0.9 % (FLUSH) 0.9 %
5-40 SYRINGE (ML) INJECTION PRN
Status: DISCONTINUED | OUTPATIENT
Start: 2023-10-18 | End: 2023-10-18 | Stop reason: HOSPADM

## 2023-10-18 RX ORDER — SODIUM CHLORIDE, SODIUM LACTATE, POTASSIUM CHLORIDE, CALCIUM CHLORIDE 600; 310; 30; 20 MG/100ML; MG/100ML; MG/100ML; MG/100ML
INJECTION, SOLUTION INTRAVENOUS CONTINUOUS
Status: DISCONTINUED | OUTPATIENT
Start: 2023-10-18 | End: 2023-10-18 | Stop reason: HOSPADM

## 2023-10-18 RX ADMIN — PROPOFOL 50 MG: 10 INJECTION, EMULSION INTRAVENOUS at 09:08

## 2023-10-18 RX ADMIN — Medication 100 MCG: at 08:59

## 2023-10-18 RX ADMIN — FAMOTIDINE 20 MG: 20 TABLET, FILM COATED ORAL at 08:14

## 2023-10-18 RX ADMIN — PROPOFOL 50 MG: 10 INJECTION, EMULSION INTRAVENOUS at 09:14

## 2023-10-18 RX ADMIN — PROPOFOL 50 MG: 10 INJECTION, EMULSION INTRAVENOUS at 09:03

## 2023-10-18 RX ADMIN — SODIUM CHLORIDE, POTASSIUM CHLORIDE, SODIUM LACTATE AND CALCIUM CHLORIDE: 600; 310; 30; 20 INJECTION, SOLUTION INTRAVENOUS at 08:15

## 2023-10-18 RX ADMIN — Medication 100 MCG: at 08:56

## 2023-10-18 RX ADMIN — PROPOFOL 50 MG: 10 INJECTION, EMULSION INTRAVENOUS at 08:58

## 2023-10-18 RX ADMIN — Medication 200 MCG: at 09:03

## 2023-10-18 RX ADMIN — PROPOFOL 50 MG: 10 INJECTION, EMULSION INTRAVENOUS at 08:51

## 2023-10-18 RX ADMIN — PROPOFOL 50 MG: 10 INJECTION, EMULSION INTRAVENOUS at 08:54

## 2023-10-18 RX ADMIN — PROPOFOL 150 MG: 10 INJECTION, EMULSION INTRAVENOUS at 08:48

## 2023-10-18 RX ADMIN — LIDOCAINE HYDROCHLORIDE 100 MG: 20 INJECTION, SOLUTION EPIDURAL; INFILTRATION; INTRACAUDAL; PERINEURAL at 08:48

## 2023-10-18 ASSESSMENT — PAIN - FUNCTIONAL ASSESSMENT: PAIN_FUNCTIONAL_ASSESSMENT: 0-10

## 2023-10-18 NOTE — ANESTHESIA PRE PROCEDURE
01:41 PM    BUN 19 04/20/2023 01:41 PM    CREATININE 1.45 04/20/2023 01:41 PM    LABGLOM 57 04/20/2023 01:41 PM    GLUCOSE 108 04/20/2023 01:41 PM    CALCIUM 9.2 04/20/2023 01:41 PM       POC Tests: No results for input(s): \"POCGLU\", \"POCNA\", \"POCK\", \"POCCL\", \"POCBUN\", \"POCHEMO\", \"POCHCT\" in the last 72 hours. Coags: No results found for: \"PROTIME\", \"INR\", \"APTT\"    HCG (If Applicable): No results found for: \"PREGTESTUR\", \"PREGSERUM\", \"HCG\", \"HCGQUANT\"     ABGs: No results found for: \"PHART\", \"PO2ART\", \"VRP1OZI\", \"LUJ1CVQ\", \"BEART\", \"S3BZCPOZ\"     Type & Screen (If Applicable):  No results found for: \"LABABO\", \"LABRH\"    Drug/Infectious Status (If Applicable):  No results found for: \"HIV\", \"HEPCAB\"    COVID-19 Screening (If Applicable): No results found for: \"COVID19\"        Anesthesia Evaluation  Patient summary reviewed  Airway: Mallampati: II  TM distance: >3 FB   Neck ROM: full  Mouth opening: > = 3 FB  C-spine cleared Dental: normal exam         Pulmonary:Negative Pulmonary ROS and normal exam  breath sounds clear to auscultation                             Cardiovascular:Negative CV ROS  Exercise tolerance: good (>4 METS),         ECG reviewed  Rhythm: regular  Rate: normal                    Neuro/Psych:   Negative Neuro/Psych ROS  (+) psychiatric history:depression/anxiety             GI/Hepatic/Renal: Neg GI/Hepatic/Renal ROS            Endo/Other: Negative Endo/Other ROS   (+) : arthritis:., .            C-spine cleared           Abdominal:             Vascular: negative vascular ROS. Other Findings:             Anesthesia Plan      general     ASA 2       Induction: intravenous. MIPS: Postoperative opioids intended and Prophylactic antiemetics administered. Anesthetic plan and risks discussed with patient.                         Marcello Devries MD   10/18/2023

## (undated) DEVICE — SODIUM CHL 0.9% IRRIG.

## (undated) DEVICE — CATHETER SUCT TR FL TIP 14FR W/ O CTRL

## (undated) DEVICE — BANDAGE COBAN 6 IN WND 6INX5YD FOAM

## (undated) DEVICE — CATHETER THOR 36FR DIA10.7MM POLYVI CHL TRCR TIP STR SFT

## (undated) DEVICE — COVER,MAYO STAND,STERILE: Brand: MEDLINE

## (undated) DEVICE — SPONGE GZ W4XL4IN RAYON POLY 4 PLY NONWOVEN FASTER WICKING

## (undated) DEVICE — CANNULA NSL AD TBNG L14FT STD PVC O2 CRV CONN NONFLARED NSL

## (undated) DEVICE — NEEDLE SPNL L3.5IN PNK HUB S STL REG WALL FIT STYL W/ QNCKE

## (undated) DEVICE — SYSTEM LBL CORRECT MED FLAG 4-IN-1 MARKER SHT OF 16

## (undated) DEVICE — APPLICATOR MEDICATED 26 CC SOLUTION HI LT ORNG CHLORAPREP

## (undated) DEVICE — SOLUTION IRRIG 1000ML H2O STRL BLT

## (undated) DEVICE — LINER SUCT CANSTR 3000CC PLAS SFT PRE ASSEMB W/OUT TBNG W/

## (undated) DEVICE — SYRINGE MED 10ML LUERLOCK TIP W/O SFTY DISP

## (undated) DEVICE — BANDAGE COMPR W6INXL12FT SMOOTH FOR LIMB EXSANG ESMARCH

## (undated) DEVICE — PADDING CAST W6INXL4YD SYNTHETIC NATURAL PROTOUCH

## (undated) DEVICE — TUBING, SUCTION, 9/32" X 10', STRAIGHT: Brand: MEDLINE

## (undated) DEVICE — FORCEPS BX L240CM JAW DIA2.8MM L CAP W/ NDL MIC MESH TOOTH

## (undated) DEVICE — GAUZE,SPONGE,4"X4",16PLY,XRAY,STRL,LF: Brand: MEDLINE

## (undated) DEVICE — CANNULA ORIG TL CLR W FOAM CUSHIONS AND 14FT SUPL TB 3 CHN

## (undated) DEVICE — SYRINGE MED 50ML LUERSLIP TIP

## (undated) DEVICE — GLOVE SURG UNDERGLOVE 7.5 PF BLU BIOGEL PI MIC LF

## (undated) DEVICE — 60-7070-105 TRNQT,DPSB,PLC BLUE: Brand: MEDLINE RENEWAL

## (undated) DEVICE — YANKAUER,SMOOTH HANDLE,HIGH CAPACITY: Brand: MEDLINE INDUSTRIES, INC.

## (undated) DEVICE — GOWN,SIRUS,NONRNF,XLN/XL,20/CS: Brand: MEDLINE

## (undated) DEVICE — SNARE POLYP M W27MMXL240CM OVL STIFF DISP CAPTIVATOR

## (undated) DEVICE — GLOVE SURG 7 BIOGEL PI ULTRATOUCH G

## (undated) DEVICE — INTENDED FOR TISSUE SEPARATION, AND OTHER PROCEDURES THAT REQUIRE A SHARP SURGICAL BLADE TO PUNCTURE OR CUT.: Brand: BARD-PARKER ® STAINLESS STEEL BLADES

## (undated) DEVICE — GOWN,AURORA,FABRIC-REINFORCED,X-LARGE: Brand: MEDLINE

## (undated) DEVICE — GOWN PLASTIC FILM THMBHKS UNIV BLUE: Brand: CARDINAL HEALTH

## (undated) DEVICE — SYRINGE 20ML LL S/C 50

## (undated) DEVICE — MEDI-VAC NON-CONDUCTIVE SUCTION TUBING: Brand: CARDINAL HEALTH

## (undated) DEVICE — UNDERPAD INCONT W23XL36IN STD BLU POLYPR BK FLUF SFT

## (undated) DEVICE — ENDOSCOPY PUMP TUBING/ CAP SET: Brand: ERBE

## (undated) DEVICE — PACK,EXTREMITY III: Brand: MEDLINE

## (undated) DEVICE — GLOVE SURG SZ 65 THK91MIL LTX FREE SYN POLYISOPRENE

## (undated) DEVICE — 4.5 MM ORBIT INCIISOR PLUS CURVED                                    BLADES, POWER/EP-1, POWER/EP-1,                                    VIOLET, CURVE LENGTH 17 MM, PACKAGED                                    6 PER BOX, STERILE

## (undated) DEVICE — BANDAGE COMPR W6INXL5YD WHT BGE POLY COT M E WRP WV HK AND

## (undated) DEVICE — LEGGINGS, PAIR, 33X51 XL, STERILE: Brand: MEDLINE

## (undated) DEVICE — GAUZE,SPONGE,4"X4",16PLY,STRL,LF,10/TRAY: Brand: MEDLINE

## (undated) DEVICE — INFLOW CASSETTE TUBING, DO NOT USE IF PACKAGE IS DAMAGED: Brand: CROSSFLOW

## (undated) DEVICE — 3M™ STERI-STRIP™ REINFORCED ADHESIVE SKIN CLOSURES, R1547, 1/2 IN X 4 IN (12 MM X 100 MM), 6 STRIPS/ENVELOPE: Brand: 3M™ STERI-STRIP™

## (undated) DEVICE — SYRINGE MED 25GA 3ML L5/8IN SUBQ PLAS W/ DETACH NDL SFTY

## (undated) DEVICE — STOCKINETTE ORTHOPEDIC IMPERV 36X9 IN STRL

## (undated) DEVICE — SYRINGE MED 30ML STD CLR PLAS LUERLOCK TIP N CTRL DISP

## (undated) DEVICE — COUNTER NDL 40 COUNT HLD 70 FOAM BLK ADH W/ MAG